# Patient Record
(demographics unavailable — no encounter records)

---

## 2024-11-08 NOTE — CHIEF COMPLAINT
[Urgent Visit] : Urgent Visit [FreeTextEntry1] : "I took a home pregnancy test and it was positive on 10/28/24."

## 2024-11-08 NOTE — PHYSICAL EXAM
[Chaperone Declined] : Patient declined chaperone [Bluish Discoloration] : did not have a bluish discoloration [Awake] : awake [Alert] : alert [Soft] : soft [Oriented x3] : oriented to person, place, and time [Labia Majora] : labia major [Labia Minora] : labia minora [Normal] : clitoris [No Bleeding] : there was no active vaginal bleeding [Uterine Adnexae] : were not tender and not enlarged [Acute Distress] : no acute distress [Mass] : no breast mass [Nipple Discharge] : no nipple discharge [Axillary LAD] : no axillary lymphadenopathy [Tender] : non tender [Labia Majora Erythema] : no erythema of the labia majora [FreeTextEntry6] : wnl, no enlargement

## 2024-11-08 NOTE — HISTORY OF PRESENT ILLNESS
[___ Month(s) Ago] : [unfilled] month(s) ago [M. Frequency ___ (days)] : menses frequency [unfilled] days [M. Duration ___ (days)] : menses duration [unfilled] day(s) [Periodic Abdominal Pain] : abdominal pain [Pregnancy] : pregnant [Sexually Active] : is sexually active [Monogamous] : is monogamous

## 2024-11-08 NOTE — PLAN
[TextEntry] : - pregnancy test revealed positive  - HCG serum done today  - U/S revealed fluid filled sac which "may represent gestational sac. No fetal pole or yolk sac is seen." --> r/o chemical pregnancy --> repeat u/s in 2 weeks  - warning signs reviewed and when to report   - PE done today including breast and pelvic exam --> no enlargement of uterus, no aj sign noted  - flu shot given today  - RTO in 2 weeks or sooner for HCG

## 2024-11-22 NOTE — REASON FOR VISIT
[Follow-Up] : a follow-up evaluation of [Breast Complaint -  Pregnant] : breast complaint -  pregnant [Amenorrhea] : amenorrhea

## 2024-11-22 NOTE — HISTORY OF PRESENT ILLNESS
[FreeTextEntry1] : 32y/o  here for amenorrhea secondary to pregnancy. Had a follow up dating sonogram today. Showing more symptoms of pregnancy than last visit including nausea. Not vomiting associated. This is a desired pregnancy. Has an SAB that passed with no interventions on 24. No return of menses since prior to the last pregnancy. Is still intermittently breastfeeding first child.  Denies VB and cramping.  OB Hx: G1   39w6d FC 6#4 G2 SAB 24 - POC passed with in intervention G3 current GYN Hx: Chlamydia 2019 treated, no abnormal paps, no ovarian cysts, no uterine fibroids Medical Hx: anxiety/depression (no meds) Surgical Hx: ACL L. knee  Family Hx: maternal grandmother Breast cancer dx in 80s, colon cancer on mother and father side Social Hx: lives with  Daryl and daughter, works as [employer], denies use of ETOH/drugs/tobacco Medications: PNV NKDA

## 2024-11-22 NOTE — PLAN
[TextEntry] : - dating u/s reveals 7w3d by CRL, ELZA 7/8/25  - reviewed fetal/carrier screen testing available --> patient desires all screening including NIPTs/[facility] carrier/ultrascreen/nuchal  - PE done last visit - Flu shot given last visit  - prenatal labs done today  - ordered B6 and unisom for nausea  - reviewed first trimester teachings  - referral for NT scan placed  - RTO @ 12wks for NT/ultrascreen and initial prenatal appointment

## 2025-07-08 NOTE — HISTORY OF PRESENT ILLNESS
[Postpartum Consultation] : postpartum consultation [Complications:___] : complications include: [unfilled] [Delivery Date: ___] : on [unfilled] [Primary C/S] : delivered by  section [Female] : Delivery History: baby girl [Wt. ___] : weighing [unfilled] [Breastfeeding] : currently nursing [BreastFeeding Problems] : breastfeeding problems [S/Sx PP Depression] : no signs/symptoms of postpartum depression [Incisional Drainage] : no incisional drainage [Incisional Pain] : no incisional pain [Suicidal Ideation] : no suicidal ideation [None] : No associated symptoms are reported [Clean/Dry/Intact] : clean, dry and intact [Not Done] : Examination of breasts not done [Doing Well] : is doing well [No Sign of Infection] : is showing no signs of infection [FreeTextEntry8] : coping well. feeling bouts of guilt over not being able to give 2 year old daughter attention. has lots of support from  and family.  [de-identified] : reports baby gets alot of milk but doesn't swallow from the breast. Does well with pumped milk in a bottle.  [de-identified] : steri strips removed. healing well.  [de-identified] : EPDS 4 [de-identified] : steri strips removed,  IBCLC and oral OT resources given  RTC in 2 weeks and 5 weeks